# Patient Record
Sex: MALE | Race: WHITE | Employment: FULL TIME | URBAN - METROPOLITAN AREA
[De-identification: names, ages, dates, MRNs, and addresses within clinical notes are randomized per-mention and may not be internally consistent; named-entity substitution may affect disease eponyms.]

---

## 2018-11-19 ENCOUNTER — OFFICE VISIT (OUTPATIENT)
Dept: OTOLARYNGOLOGY | Facility: CLINIC | Age: 38
End: 2018-11-19
Payer: COMMERCIAL

## 2018-11-19 VITALS
DIASTOLIC BLOOD PRESSURE: 84 MMHG | BODY MASS INDEX: 22.53 KG/M2 | OXYGEN SATURATION: 98 % | SYSTOLIC BLOOD PRESSURE: 160 MMHG | HEIGHT: 73 IN | WEIGHT: 170 LBS | HEART RATE: 107 BPM

## 2018-11-19 DIAGNOSIS — I77.6 VASCULITIS (H): Primary | ICD-10-CM

## 2018-11-19 DIAGNOSIS — K11.20 PAROTITIS: ICD-10-CM

## 2018-11-19 LAB
BASOPHILS # BLD AUTO: 0.1 10E9/L (ref 0–0.2)
BASOPHILS NFR BLD AUTO: 1.2 %
CRP SERPL-MCNC: <2.9 MG/L (ref 0–8)
DIFFERENTIAL METHOD BLD: NORMAL
EOSINOPHIL NFR BLD AUTO: 3 %
ERYTHROCYTE [DISTWIDTH] IN BLOOD BY AUTOMATED COUNT: 12.6 % (ref 10–15)
HCT VFR BLD AUTO: 46.5 % (ref 40–53)
HGB BLD-MCNC: 15.5 G/DL (ref 13.3–17.7)
IMM GRANULOCYTES # BLD: 0 10E9/L (ref 0–0.4)
IMM GRANULOCYTES NFR BLD: 0.2 %
LYMPHOCYTES # BLD AUTO: 1.1 10E9/L (ref 0.8–5.3)
LYMPHOCYTES NFR BLD AUTO: 18.1 %
MCH RBC QN AUTO: 29.5 PG (ref 26.5–33)
MCHC RBC AUTO-ENTMCNC: 33.3 G/DL (ref 31.5–36.5)
MCV RBC AUTO: 89 FL (ref 78–100)
MONOCYTES # BLD AUTO: 0.4 10E9/L (ref 0–1.3)
MONOCYTES NFR BLD AUTO: 7.3 %
NEUTROPHILS # BLD AUTO: 4.2 10E9/L (ref 1.6–8.3)
NEUTROPHILS NFR BLD AUTO: 70.2 %
NRBC # BLD AUTO: 0 10*3/UL
NRBC BLD AUTO-RTO: 0 /100
PLATELET # BLD AUTO: 255 10E9/L (ref 150–450)
RBC # BLD AUTO: 5.25 10E12/L (ref 4.4–5.9)
WBC # BLD AUTO: 5.9 10E9/L (ref 4–11)

## 2018-11-19 PROCEDURE — 86038 ANTINUCLEAR ANTIBODIES: CPT | Performed by: OTOLARYNGOLOGY

## 2018-11-19 PROCEDURE — 36415 COLL VENOUS BLD VENIPUNCTURE: CPT | Performed by: OTOLARYNGOLOGY

## 2018-11-19 PROCEDURE — 86140 C-REACTIVE PROTEIN: CPT | Performed by: OTOLARYNGOLOGY

## 2018-11-19 PROCEDURE — 99243 OFF/OP CNSLTJ NEW/EST LOW 30: CPT | Performed by: OTOLARYNGOLOGY

## 2018-11-19 PROCEDURE — 85025 COMPLETE CBC W/AUTO DIFF WBC: CPT | Performed by: OTOLARYNGOLOGY

## 2018-11-19 RX ORDER — MULTIPLE VITAMINS W/ MINERALS TAB 9MG-400MCG
1 TAB ORAL DAILY
COMMUNITY

## 2018-11-19 NOTE — LETTER
11/19/2018         RE: Wilfrido Lincoln  [address]  [address]  [facility] [address]        Dear Colleague,    Thank you for referring your patient, Wilfrido Lincoln, to the [facility]. Please see a copy of my visit note below.    ENT Consultation    Wilfrido Lincoln is a 38 year old male who is seen in consultation at the request of Dr.Luke Porter.      History of Present Illness - Wilfrido Lincoln is a 38 year old male here today for swollen left parotid gland, fatigue, heart palpitations, dry mouth, SOB when doing any exercise for the past couple weeks.     Patient notes that he has heart palpitations during the night when trying to fall asleep. He had excess sweating and rash on the chest. The left parotid gland swelling without much pain before 10/31/18. No changes in hearing. He denies dry eyes, but does have dry mouth.     Patient had a scan done through [facility] that showed mild left parotiditis without any sign of an obstructing stone. Also noted was trace fluid in the superior pericardial recess.       BP Readings from Last 1 Encounters:   11/19/18 160/84     BP noted to be elevated today in office.  Patient to follow up with Primary Care provider regarding elevated blood pressure.    Wilfrido IS NOT a smoker/uses chewing tobacco.      Past Medical History - No past medical history on file.    Current Medications -   Current Outpatient Prescriptions:      Esomeprazole Magnesium (NEXIUM PO), Take 20 mg by mouth, Disp: , Rfl:      multivitamin, therapeutic with minerals (MULTI-VITAMIN) TABS tablet, Take 1 tablet by mouth daily, Disp: , Rfl:     Allergies -   Allergies   Allergen Reactions     Cats Visual Disturbance     No Known Drug Allergies        Social History -   Social History     Social History     Marital status:      Spouse name: N/A     Number of children: N/A     Years of education: N/A     Social History Main Topics     Smoking status: Never Smoker     Smokeless  "tobacco: Never Used     Alcohol use Yes      Comment: rarely     Drug use: No     Sexual activity: Yes     Partners: Female     Birth control/ protection: Pill     Other Topics Concern     None     Social History Narrative       Family History -   Family History   Problem Relation Age of Onset     Breast Cancer Mother      Asthma No family hx of      C.A.D. No family hx of      Diabetes No family hx of      Hypertension No family hx of      Cerebrovascular Disease No family hx of      Cancer - colorectal No family hx of      Prostate Cancer No family hx of        Review of Systems - As per HPI and PMHx, otherwise review of system review of the head and neck negative.    Physical Exam  /84  Pulse 107  Ht 1.854 m (6' 1\")  Wt 77.1 kg (170 lb)  SpO2 98%  BMI 22.43 kg/m2  BMI: Body mass index is 22.43 kg/(m^2).    General - The patient is well nourished and well developed, and appears to have good nutritional status.  Alert and oriented to person and place, answers questions and cooperates with examination appropriately.    SKIN - No suspicious lesions or rashes.  Respiration - No respiratory distress.  Head and Face - Normocephalic and atraumatic, with no gross asymmetry noted of the contour of the facial features.  The facial nerve is intact, with strong symmetric movements.    Voice and Breathing - The patient was breathing comfortably without the use of accessory muscles. The patients voice was clear and strong, and had appropriate pitch and quality.    Ears - Bilateral pinna and EACs with normal appearing overlying skin. Tympanic membrane intact with good mobility on pneumatic otoscopy bilaterally. Bony landmarks of the ossicular chain are normal. The tympanic membranes are normal in appearance. No retraction, perforation, or masses.  No fluid or purulence was seen in the external canal or the middle ear.     Eyes - Extraocular movements intact.  Sclera were not icteric or injected, conjunctiva were pink " and moist.    Mouth - Examination of the oral cavity showed pink, healthy oral mucosa. No lesions or ulcerations noted.  The tongue was mobile and midline, and the dentition were in good condition.      Throat - The walls of the oropharynx were smooth, pink, moist, symmetric, and had no lesions or ulcerations.  The tonsillar pillars and soft palate were symmetric.  The uvula was midline on elevation.    Neck - Normal midline excursion of the laryngotracheal complex during swallowing.  Full range of motion on passive movement.  Palpation of the occipital, submental, submandibular, internal jugular chain, and supraclavicular nodes did not demonstrate any abnormal lymph nodes or masses.  The carotid pulse was palpable bilaterally.  Palpation of the thyroid was soft and smooth, with no nodules or goiter appreciated.  The trachea was mobile and midline.    Nose - External contour is symmetric, no gross deflection or scars.  Nasal mucosa is pink and moist with no abnormal mucus.  The septum was midline and non-obstructive, turbinates of normal size and position.  No polyps, masses, or purulence noted on examination.    Neuro - Nonfocal neuro exam is normal, CN 2 through 12 intact, normal gait and muscle tone.      Performed in clinic today:  No procedures preformed in clinic today      A/P - Wilfriod Lincoln is a 38 year old male with significant amount of concerns regarding possible systemic conditions.  His both parents  of cancer and he voices a lot of concern regarding potential problematic conditions causing all this problems.  Considering the findings of slightly enlarged parotid on a CT scan but also with limited skin some findings of some pericardial effusion would like to certainly start the workup for systemic conditions.  Therefore his CRP MARYANN sedimentation rate and CBC with differential ordered.  Patient will follow up with his [facility] primary care provider for further systemic evaluation of his  condition.  We should like to see him back as needed.  In the meantime hydration is encouraged which she is already doing.      This document serves as a record of the services and decisions personally performed and made by Dr. Epifanio Gil MD. It was created on his behalf by Rain Kline, a trained medical scribe. The creation of this document is based the provider's statements to the medical scribe.  Rain Kline 11/19/2018    Provider:   The information in this document, created by the medical scribe for me, accurately reflects the services I personally performed and the decisions made by me. I have reviewed and approved this document for accuracy prior to leaving the patient care area.  Dr. Epifanio Gil MD 11/19/2018    Epifanio Gil MD      Again, thank you for allowing me to participate in the care of your patient.        Sincerely,        Epifanio Gil MD, MD

## 2018-11-19 NOTE — MR AVS SNAPSHOT
"              After Visit Summary   2018    Wilfrido Lincoln    MRN: [id number]           Patient Information     Date Of Birth          1980        Visit Information        Provider Department      2018 1:15 PM Epifanio Gil MD [facility]         Follow-ups after your visit        Who to contact     If you have questions or need follow up information about today's clinic visit or your schedule please contact [facility] directly at [phone].  Normal or non-critical lab and imaging results will be communicated to you by [facility], letter or phone within 4 business days after the clinic has received the results. If you do not hear from us within 7 days, please contact the clinic through [facility] or phone. If you have a critical or abnormal lab result, we will notify you by phone as soon as possible.  Submit refill requests through [facility] or call your pharmacy and they will forward the refill request to us. Please allow 3 business days for your refill to be completed.          Additional Information About Your Visit        [facility] Information     [facility] lets you send messages to your doctor, view your test results, renew your prescriptions, schedule appointments and more. To sign up, go to [email or web address] . Click on \"Log in\" on the left side of the screen, which will take you to the Welcome page. Then click on \"Sign up Now\" on the right side of the page.     You will be asked to enter the access code listed below, as well as some personal information. Please follow the directions to create your username and password.     Your access code is: [email or web address]  Expires: 2019  2:08 PM     Your access code will  in 90 days. If you need help or a new code, please call your [facility] or [phone].        Care EveryWhere ID     This is your Care EveryWhere ID. This could be used by other organizations to access your [facility] medical " "records  [id number]        Your Vitals Were     Pulse Height Pulse Oximetry BMI (Body Mass Index)          107 1.854 m (6' 1\") 98% 22.43 kg/m2         Blood Pressure from Last 3 Encounters:   11/19/18 160/84   05/29/08 129/79   04/21/08 116/70    Weight from Last 3 Encounters:   11/19/18 77.1 kg (170 lb)   05/29/08 73.5 kg (162 lb)   04/21/08 74.5 kg (164 lb 3.2 oz)              Today, you had the following     No orders found for display       Primary Care Provider Office Phone # Fax #    Solomon Kai Schwartz [phone] [address] [place] [address]        Equal Access to Services     GRIFFIN ZEPEDA : Hadii aad ku hadasho Soomaali, waaxda luqadaha, qaybta kaalmada adeegyada, celio bhagat . So [facility] [phone].    ATENCIÓN: Si habla español, tiene a nuno disposición servicios gratuitos de asistencia lingüística. Llame al [phone].    We comply with applicable federal civil rights laws and [place] laws. We do not discriminate on the basis of race, color, national origin, age, disability, sex, sexual orientation, or gender identity.            Thank you!     Thank you for choosing [facility]  for your care. Our goal is always to provide you with excellent care. Hearing back from our patients is one way we can continue to improve our services. Please take a few minutes to complete the written survey that you may receive in the mail after your visit with us. Thank you!             Your Updated Medication List - Protect others around you: Learn how to safely use, store and throw away your medicines at www.disposemymeds.org.          This list is accurate as of 11/19/18  2:08 PM.  Always use your most recent med list.                   Brand Name Dispense Instructions for use Diagnosis    Multi-vitamin Tabs tablet      Take 1 tablet by mouth daily        NEXIUM PO      Take 20 mg by mouth          "

## 2018-11-19 NOTE — PROGRESS NOTES
ENT Consultation    Wilfrido Lincoln is a 38 year old male who is seen in consultation at the request of Dr.Luke Porter.      History of Present Illness - Wilfrido Lincoln is a 38 year old male here today for swollen left parotid gland, fatigue, heart palpitations, dry mouth, SOB when doing any exercise for the past couple weeks.     Patient notes that he has heart palpitations during the night when trying to fall asleep. He had excess sweating and rash on the chest. The left parotid gland swelling without much pain before 10/31/18. No changes in hearing. He denies dry eyes, but does have dry mouth.     Patient had a scan done through [facility] that showed mild left parotiditis without any sign of an obstructing stone. Also noted was trace fluid in the superior pericardial recess.       BP Readings from Last 1 Encounters:   11/19/18 160/84     BP noted to be elevated today in office.  Patient to follow up with Primary Care provider regarding elevated blood pressure.    Wilfrido IS NOT a smoker/uses chewing tobacco.      Past Medical History - No past medical history on file.    Current Medications -   Current Outpatient Prescriptions:      Esomeprazole Magnesium (NEXIUM PO), Take 20 mg by mouth, Disp: , Rfl:      multivitamin, therapeutic with minerals (MULTI-VITAMIN) TABS tablet, Take 1 tablet by mouth daily, Disp: , Rfl:     Allergies -   Allergies   Allergen Reactions     Cats Visual Disturbance     No Known Drug Allergies        Social History -   Social History     Social History     Marital status:      Spouse name: N/A     Number of children: N/A     Years of education: N/A     Social History Main Topics     Smoking status: Never Smoker     Smokeless tobacco: Never Used     Alcohol use Yes      Comment: rarely     Drug use: No     Sexual activity: Yes     Partners: Female     Birth control/ protection: Pill     Other Topics Concern     None     Social History Narrative       Family History -   Family  "History   Problem Relation Age of Onset     Breast Cancer Mother      Asthma No family hx of      C.A.D. No family hx of      Diabetes No family hx of      Hypertension No family hx of      Cerebrovascular Disease No family hx of      Cancer - colorectal No family hx of      Prostate Cancer No family hx of        Review of Systems - As per HPI and PMHx, otherwise review of system review of the head and neck negative.    Physical Exam  /84  Pulse 107  Ht 1.854 m (6' 1\")  Wt 77.1 kg (170 lb)  SpO2 98%  BMI 22.43 kg/m2  BMI: Body mass index is 22.43 kg/(m^2).    General - The patient is well nourished and well developed, and appears to have good nutritional status.  Alert and oriented to person and place, answers questions and cooperates with examination appropriately.    SKIN - No suspicious lesions or rashes.  Respiration - No respiratory distress.  Head and Face - Normocephalic and atraumatic, with no gross asymmetry noted of the contour of the facial features.  The facial nerve is intact, with strong symmetric movements.    Voice and Breathing - The patient was breathing comfortably without the use of accessory muscles. The patients voice was clear and strong, and had appropriate pitch and quality.    Ears - Bilateral pinna and EACs with normal appearing overlying skin. Tympanic membrane intact with good mobility on pneumatic otoscopy bilaterally. Bony landmarks of the ossicular chain are normal. The tympanic membranes are normal in appearance. No retraction, perforation, or masses.  No fluid or purulence was seen in the external canal or the middle ear.     Eyes - Extraocular movements intact.  Sclera were not icteric or injected, conjunctiva were pink and moist.    Mouth - Examination of the oral cavity showed pink, healthy oral mucosa. No lesions or ulcerations noted.  The tongue was mobile and midline, and the dentition were in good condition.      Throat - The walls of the oropharynx were smooth, " pink, moist, symmetric, and had no lesions or ulcerations.  The tonsillar pillars and soft palate were symmetric.  The uvula was midline on elevation.    Neck - Normal midline excursion of the laryngotracheal complex during swallowing.  Full range of motion on passive movement.  Palpation of the occipital, submental, submandibular, internal jugular chain, and supraclavicular nodes did not demonstrate any abnormal lymph nodes or masses.  The carotid pulse was palpable bilaterally.  Palpation of the thyroid was soft and smooth, with no nodules or goiter appreciated.  The trachea was mobile and midline.    Nose - External contour is symmetric, no gross deflection or scars.  Nasal mucosa is pink and moist with no abnormal mucus.  The septum was midline and non-obstructive, turbinates of normal size and position.  No polyps, masses, or purulence noted on examination.    Neuro - Nonfocal neuro exam is normal, CN 2 through 12 intact, normal gait and muscle tone.      Performed in clinic today:  No procedures preformed in clinic today      KRISTOPHER/P - Wilfrido Lincoln is a 38 year old male with significant amount of concerns regarding possible systemic conditions.  His both parents  of cancer and he voices a lot of concern regarding potential problematic conditions causing all this problems.  Considering the findings of slightly enlarged parotid on a CT scan but also with limited skin some findings of some pericardial effusion would like to certainly start the workup for systemic conditions.  Therefore his CRP MARYANN sedimentation rate and CBC with differential ordered.  Patient will follow up with his [facility] primary care provider for further systemic evaluation of his condition.  We should like to see him back as needed.  In the meantime hydration is encouraged which she is already doing.      This document serves as a record of the services and decisions personally performed and made by Dr. Epifanio Gil MD. It was  created on his behalf by Rain Kline, a trained medical scribe. The creation of this document is based the provider's statements to the medical scribe.  Rain Kline 11/19/2018    Provider:   The information in this document, created by the medical scribe for me, accurately reflects the services I personally performed and the decisions made by me. I have reviewed and approved this document for accuracy prior to leaving the patient care area.  Dr. Epifanio Gil MD 11/19/2018    Epifanio Gil MD

## 2018-11-20 ENCOUNTER — TELEPHONE (OUTPATIENT)
Dept: OTOLARYNGOLOGY | Facility: CLINIC | Age: 38
End: 2018-11-20

## 2018-11-20 LAB — ANA SER QL IF: NEGATIVE

## 2018-11-20 NOTE — TELEPHONE ENCOUNTER
Reason for Call:  Other call back    Detailed comments: patient called asking about lab results.  Please call    Phone Number Patient can be reached at: Home number on file [phone] (home)    Best Time: any    Can we leave a detailed message on this number? YES    Call taken on 11/20/2018 at 4:33 PM by Sammi Montgomery

## 2019-12-08 ENCOUNTER — HEALTH MAINTENANCE LETTER (OUTPATIENT)
Age: 39
End: 2019-12-08

## 2021-01-09 ENCOUNTER — HEALTH MAINTENANCE LETTER (OUTPATIENT)
Age: 41
End: 2021-01-09

## 2021-10-23 ENCOUNTER — HEALTH MAINTENANCE LETTER (OUTPATIENT)
Age: 41
End: 2021-10-23

## 2022-02-12 ENCOUNTER — HEALTH MAINTENANCE LETTER (OUTPATIENT)
Age: 42
End: 2022-02-12

## 2022-10-09 ENCOUNTER — HEALTH MAINTENANCE LETTER (OUTPATIENT)
Age: 42
End: 2022-10-09

## 2023-03-25 ENCOUNTER — HEALTH MAINTENANCE LETTER (OUTPATIENT)
Age: 43
End: 2023-03-25